# Patient Record
Sex: FEMALE | Race: WHITE | NOT HISPANIC OR LATINO | Employment: STUDENT | URBAN - METROPOLITAN AREA
[De-identification: names, ages, dates, MRNs, and addresses within clinical notes are randomized per-mention and may not be internally consistent; named-entity substitution may affect disease eponyms.]

---
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
## 2023-11-09 ENCOUNTER — OFFICE VISIT (OUTPATIENT)
Dept: OBGYN CLINIC | Facility: OTHER | Age: 23
End: 2023-11-09
Payer: COMMERCIAL

## 2023-11-09 ENCOUNTER — APPOINTMENT (OUTPATIENT)
Dept: RADIOLOGY | Facility: OTHER | Age: 23
End: 2023-11-09
Payer: COMMERCIAL

## 2023-11-09 VITALS
WEIGHT: 208 LBS | HEIGHT: 68 IN | BODY MASS INDEX: 31.52 KG/M2 | DIASTOLIC BLOOD PRESSURE: 80 MMHG | SYSTOLIC BLOOD PRESSURE: 126 MMHG | HEART RATE: 87 BPM

## 2023-11-09 DIAGNOSIS — M25.512 LEFT SHOULDER PAIN, UNSPECIFIED CHRONICITY: ICD-10-CM

## 2023-11-09 DIAGNOSIS — S49.92XA INJURY OF LEFT SHOULDER, INITIAL ENCOUNTER: Primary | ICD-10-CM

## 2023-11-09 PROCEDURE — 99214 OFFICE O/P EST MOD 30 MIN: CPT | Performed by: FAMILY MEDICINE

## 2023-11-09 PROCEDURE — 73030 X-RAY EXAM OF SHOULDER: CPT

## 2023-11-09 NOTE — PROGRESS NOTES
1. Injury of left shoulder, initial encounter    2. Left shoulder pain, unspecified chronicity        Orders Placed This Encounter   Procedures    XR shoulder 2+ vw left    MRI shoulder left wo contrast        IMAGING STUDIES: (I personally reviewed images in PACS and report):     X-ray R 11/9/2023: No acute osseous abnormality    ASSESSMENT/PLAN:    Chronic left shoulder pain status post [place] injury  Date of injury October 2022  Differential diagnosis:  Labral tear    Repeat X-ray next visit: None    Return for Follow-up after MRI is completed for review. Patient instructions below verbally summarized in person during encounter:  Patient Instructions   Recommended begin rehabilitation for her shoulder for possible labral tear      __________________________________________________________________________    HISTORY OF PRESENT ILLNESS:    26 y.o. female presents for initial evaluation of chronic persistent left anterior shoulder pain started after [place] injury 10/12/2022. 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 +immediate pain. +cracking sensation. +neuropraxia. Next couple of days was sore. Conservative management for 2 weeks and returned to play. Left shoulder pain:  Starting/onset: traumatic  Location: anterior  Pain scale: 4/10, pain from condition does interfere with activities of daily living  Description: sharp  Radiation: none  Night pain: occasional  Rest pain: mild  Aggravation: external and internal rotation  Associated sx: denies neck pain. Denies hand numbness, tingling, or weakness  Treatment: acetaminophen, ice, and OTC NSAIDS  Physical therapy: none  Previous CSI: none  Improvement score: gradually worse since onset      Review of Systems    Following history reviewed and updated:    Past Medical History:   Diagnosis Date    COVID-19      History reviewed.  No pertinent surgical history. Social History   Social History     Substance and Sexual Activity   Alcohol Use Yes     Social History     Substance and Sexual Activity   Drug Use Yes    Types: Marijuana     Social History     Tobacco Use   Smoking Status Never   Smokeless Tobacco Never     Family History   Problem Relation Age of Onset    Heart attack Paternal Grandfather      Allergies   Allergen Reactions    Sulfamethoxazole-Trimethoprim Hives    Bactroban [Mupirocin] Hives     hives          Physical Exam  /80 (BP Location: Left arm, Patient Position: Sitting, Cuff Size: Standard)   Pulse 87   Ht 5' 8" (1.727 m)   Wt 94.3 kg (208 lb)   BMI 31.63 kg/m²     Constitutional:  Vitals and nursing note reviewed. General: Normal appearance. Not ill-appearing, toxic-appearing or diaphoretic. not in acute distress. HENT: Head is normocephalic and atraumatic. Bilateral external ear and nose normal  Eyes: No discharge. Extraocular movements intact. Cardiovascular: Normal rate  Pulmonary:  Pulmonary effort is normal. No respiratory distress. Musculoskeletal: No gross injury or deformity except for other than mention below. Skin: Skin is warm. Neurological: Awake, alert, and mental status is at baseline. No gross focal deficit present.   Psychiatric:  Mood normal. Behavior normal.     Ortho Exam  LEFT SHOULDER:    ROM  Touchdown sign: intact  External Rotation: intact  Internal Rotation: intact    Strength  Abduction: 5/5  ER: 5/5  IR: 5/5    Drop-Arm: negative  Emptycan: negative  Belly Press:   Lift-off Test:    Ricki: +  Neer: negative  Cross-Arm:   Speeds: ++    Internal Impingement:  (crank position pain)    Labral Crank Test :+  (abducted 90, axial load, guided IR & Er)    Modified Labral Shift:+  (seated, ER, abduction, axial load, guided abd/add)    [place]'s Test: +  (FF 90, abd 15, resist thumbs up-, resist thumbs down+)    Apprehension:  Zoey's Relocation Maneuver:    RIGHT SHOULDER:  Strength  Abduction: 5/5  ER: 5/5  IR: 5/5    ROM  Touchdown sign: intact    Empty can: negative   __________________________________________________________________________  Procedures

## 2023-11-11 ENCOUNTER — HOSPITAL ENCOUNTER (OUTPATIENT)
Dept: MRI IMAGING | Facility: HOSPITAL | Age: 23
Discharge: HOME | End: 2023-11-11
Attending: FAMILY MEDICINE
Payer: COMMERCIAL

## 2023-11-11 DIAGNOSIS — S49.92XA INJURY OF LEFT SHOULDER, INITIAL ENCOUNTER: ICD-10-CM

## 2023-11-11 PROCEDURE — G1004 CDSM NDSC: HCPCS

## 2023-11-11 PROCEDURE — 73221 MRI JOINT UPR EXTREM W/O DYE: CPT

## 2023-11-13 ENCOUNTER — OFFICE VISIT (OUTPATIENT)
Dept: OBGYN CLINIC | Facility: CLINIC | Age: 23
End: 2023-11-13
Payer: COMMERCIAL

## 2023-11-13 VITALS
BODY MASS INDEX: 31.52 KG/M2 | WEIGHT: 208 LBS | HEART RATE: 64 BPM | DIASTOLIC BLOOD PRESSURE: 78 MMHG | SYSTOLIC BLOOD PRESSURE: 123 MMHG | HEIGHT: 68 IN

## 2023-11-13 DIAGNOSIS — S49.92XA INJURY OF LEFT SHOULDER, INITIAL ENCOUNTER: Primary | ICD-10-CM

## 2023-11-13 PROCEDURE — 99213 OFFICE O/P EST LOW 20 MIN: CPT | Performed by: FAMILY MEDICINE

## 2023-11-13 NOTE — PROGRESS NOTES
1. Injury of left shoulder, initial encounter   Physical Therapy        Orders Placed This Encounter   Procedures     Physical Therapy        IMAGING STUDIES: (I personally reviewed images in PACS and report):  Mri left shoulder noncontrast 11/13/23:  No acute MR findings. PAST REPORTS:        ASSESSMENT/PLAN:  Chronic left shoulder pain status post [facility] injury  Date of injury October 2022  Differential diagnosis:  Labral tear    Repeat X-ray next visit: None    No follow-ups on file. Patient instructions below verbally summarized in person during encounter: There are no Patient Instructions on file for this visit.      __________________________________________________________________________    HISTORY OF PRESENT ILLNESS:  F/u left shoulder injury. Mri nondiagnostic. Continues to have tolerable intermittent pain. Review of Systems      Following history reviewed and update:    Past Medical History:   Diagnosis Date    COVID-19      History reviewed. No pertinent surgical history.   Social History   Social History     Substance and Sexual Activity   Alcohol Use Yes     Social History     Substance and Sexual Activity   Drug Use Yes    Types: Marijuana     Social History     Tobacco Use   Smoking Status Never   Smokeless Tobacco Never     Family History   Problem Relation Age of Onset    Heart attack Paternal Grandfather      Allergies   Allergen Reactions    Sulfamethoxazole-Trimethoprim Hives    Bactroban [Mupirocin] Hives     hives          Physical Exam  /78 (BP Location: Left arm, Patient Position: Sitting, Cuff Size: Standard)   Pulse 64   Ht 5' 8" (1.727 m)   Wt 94.3 kg (208 lb)   BMI 31.63 kg/m²     Constitutional:  see vital signs  Gen: well-developed, normocephalic/atraumatic, well-groomed  Eyes: No inflammation or discharge of conjunctiva or lids; sclera clear   Pharynx: no inflammation, lesion, or mass of lips  Neck: supple, no masses, non-distended  MSK: no inflammation, lesion, mass, or clubbing of nails and digits except for other than mentioned below  SKIN: no visible rashes or skin lesions  Pulmonary/Chest: Effort normal. No respiratory distress.    NEURO: cranial nerves grossly intact  PSYCH:  Alert and oriented to person, place, and time; recent and remote memory intact; mood normal, no depression, anxiety, or agitation, judgment and insight good and intact     Ortho Exam    LEFT SHOULDER:  Erythema: no  Swelling: no  Increased Warmth: no    Tenderness: n    ROM  Touchdown sign: intact  External Rotation: intact  Internal Rotation: intact    Strength  Abduction: 5/5  ER: 5/5  IR: 5/5    Drop-Arm: negative  Emptycan: negative  Belly Press:   Lift-off Test:    Robiosn: negative  Neer: negative  Cross-Arm:   Speeds:     Internal Impingement:+  (crank position pain)    Labral Crank Test :+  (abducted 90, axial load, guided IR & Er)    Modified Labral Shift:+  (seated, ER, abduction, axial load, guided abd/add)    [place]'s Test: +  (FF 90, abd 15, resist thumbs up-, resist thumbs down+)    Apprehension:  Zoey's Relocation Maneuver:    RIGHT SHOULDER:  Strength  Abduction: 5/5  ER: 5/5  IR: 5/5    ROM  Touchdown sign: intact    Empty can: negative    __________________________________________________________________________  Procedures